# Patient Record
(demographics unavailable — no encounter records)

---

## 2025-06-03 NOTE — DISCUSSION/SUMMARY
[Patient] : the patient [EKG obtained to assist in diagnosis and management of assessed problem(s)] : EKG obtained to assist in diagnosis and management of assessed problem(s) [Unlikely Cardiac Ischemia (Low Prob.)] : chest pain unlikely to represent cardiac ischemia (low probability) [Exercise Stress Test] : exercise stress test [Nuclear Imaging] : nuclear imaging [Hyperlipidemia] : hyperlipidemia [Lipids Test Panel] : a fasting lipid profile [Smoking Cessation] : smoking cessation [Responding to Treatment] : responding to treatment [Atrial Fibrillation] : atrial fibrillation [PVCs] : premature ventricular contractions [Event Recording] : a patient demand event recording [None] : There are no changes in medication management [de-identified] : quiescent [de-identified] : taking rosuvastatin daily [de-identified] : beta blocker daily

## 2025-06-03 NOTE — HISTORY OF PRESENT ILLNESS
[FreeTextEntry1] : Mrs. Valeria Castillo is a 64 year old woman with intermittent episodes of atypical chest pain over the years. She has a family history of atherosclerotic heart disease and hyperlipidemia and quits and  resumes cigarette smoking repeatedly. She is only occasionally bothered by heart pounding, but when occur has thumps and heart beating for over an hour. Counts pulse which is regular in 80s to 90s and has an external rhythm monitoring device which intermittently says rhythm is atrial fibrillation, but minute later says it is not. Again only intermittently taking beta blocker and does not take statin regularly, in fact probably only occasionally. She is actively smoking with no stops in over a year. Active and busy all the time, but no regular exercise.  Evaluation in ER few years ago including stress testing with radionuclide perfusion imaging was normal. Her brother had recent emergency coronary revascularization surgery.  Despite all the discussion and plans still only taking statin intermittently. Was away two weeks and forgot it. Also continues to smoke though desperately wants to quit. Fortunately recent CT scan normal. However, [place] and while hiking short of breath and unable to keep up.  And again, despite discussion last October with markedly elevated LDL only taking statin intermittently, still smoking. Palpitations come and go, currently quiescent. Walks regularly, but slowly, short of breath if walks quickly and continues to complain of chest heaviness. No dyspnea at rest.   Eighteen months ago [place], up at 4 AM, as is typical, smoked a cigarette, then palpitations, oppressive feeling, short of breath, went to lie down and after a while felt fine and rest of day well. Has had other infrequent, similar episodes.  Last seen July 2024 after 18 months hiatus, had not smoked cigarettes in nearly a month, taking statin every day. Has had no palpitations. Also had recent bronchoscopy, negative.  6/3/2025 unfortunately back to smoking pack of cigarettes a day. Otherwise feeling well, has had no palpitations, no chest pain and does take rosuvastatin every day without fail.

## 2025-06-03 NOTE — PHYSICAL EXAM
[5th Left ICS - MCL] : palpated at the 5th LICS in the midclavicular line [Normal Rate] : normal [Rhythm Regular] : regular [Normal S1] : normal S1 [Normal S2] : normal S2 [Click] : a ~M click was heard [No Murmur] : no murmurs heard [No Pitting Edema] : no pitting edema present [2+] : left 2+ [1+] : left 1+ [No Abnormalities] : the abdominal aorta was not enlarged and no bruit was heard [Palpated Width ___ (cm)] : palpated width of [unfilled]Ucm [Normal] : alert and oriented, normal memory [Right Carotid Bruit] : no bruit heard over the right carotid [Left Carotid Bruit] : no bruit heard over the left carotid [Bruit] : no bruit heard

## 2025-06-03 NOTE — CARDIOLOGY SUMMARY
[de-identified] : 8/11/2021 sinus rhythm, normal trace. 9/28/2021 sinus rhythm, normal and unchanged. 4/15/2022 sinus rhythm, normal. 2/1/2023 sinus rhythm, normal. 7/31/2024 sinus rhythm, normal. 6/3/2025 sinus rhythm, normal. [de-identified] : 2/1 - 2/8/2023 isolated SVEs, isolated ventricular ectopic beats with one triplet and a 5 beat run of SVT. Triggered events correlated with isolated ectopic beats. [de-identified] : 5/18/2022 excellent exercise, THR in stage 4, 11 minutes, normal EKG response. Normal LV size and function, small mild reversible apical defect consistent with very small segment of distal ischemia.

## 2025-06-03 NOTE — REVIEW OF SYSTEMS
[Cough] : cough [Joint Pain] : joint pain [Negative] : Heme/Lymph [Lower Back Pain] : lower back pain [SOB] : no shortness of breath [Lower Ext Edema] : no extremity edema [Palpitations] : no palpitations